# Patient Record
Sex: FEMALE | Race: WHITE | NOT HISPANIC OR LATINO | URBAN - METROPOLITAN AREA
[De-identification: names, ages, dates, MRNs, and addresses within clinical notes are randomized per-mention and may not be internally consistent; named-entity substitution may affect disease eponyms.]

---

## 2020-11-17 ENCOUNTER — OUT OF OFFICE VISIT (OUTPATIENT)
Dept: URBAN - METROPOLITAN AREA MEDICAL CENTER 28 | Facility: MEDICAL CENTER | Age: 85
End: 2020-11-17
Payer: MEDICARE

## 2020-11-17 DIAGNOSIS — K85.80 OTHER ACUTE PANCREATITIS WITHOUT INFECTION OR NECROSIS: ICD-10-CM

## 2020-11-17 DIAGNOSIS — D72.829 ELEVATED WBC COUNT: ICD-10-CM

## 2020-11-17 DIAGNOSIS — R74.01 ELEVATED ALANINE AMINOTRANSFERASE (ALT) LEVEL: ICD-10-CM

## 2020-11-17 PROCEDURE — 99232 SBSQ HOSP IP/OBS MODERATE 35: CPT | Performed by: INTERNAL MEDICINE

## 2020-11-17 PROCEDURE — G8427 DOCREV CUR MEDS BY ELIG CLIN: HCPCS | Performed by: INTERNAL MEDICINE

## 2020-11-17 PROCEDURE — 99222 1ST HOSP IP/OBS MODERATE 55: CPT | Performed by: INTERNAL MEDICINE

## 2020-11-19 ENCOUNTER — OUT OF OFFICE VISIT (OUTPATIENT)
Dept: URBAN - METROPOLITAN AREA MEDICAL CENTER 28 | Facility: MEDICAL CENTER | Age: 85
End: 2020-11-19
Payer: MEDICARE

## 2020-11-19 DIAGNOSIS — K80.50 BILE DUCT CALCULUS: ICD-10-CM

## 2020-11-19 DIAGNOSIS — R74.01 ELEVATED ALANINE AMINOTRANSFERASE (ALT) LEVEL: ICD-10-CM

## 2020-11-19 DIAGNOSIS — K86.89 ATROPHIC PANCREAS: ICD-10-CM

## 2020-11-19 DIAGNOSIS — K85.80 OTHER ACUTE PANCREATITIS WITHOUT INFECTION OR NECROSIS: ICD-10-CM

## 2020-11-19 PROCEDURE — 99232 SBSQ HOSP IP/OBS MODERATE 35: CPT | Performed by: INTERNAL MEDICINE

## 2020-11-21 ENCOUNTER — OUT OF OFFICE VISIT (OUTPATIENT)
Dept: URBAN - METROPOLITAN AREA MEDICAL CENTER 28 | Facility: MEDICAL CENTER | Age: 85
End: 2020-11-21
Payer: MEDICARE

## 2020-11-21 DIAGNOSIS — K85.80 OTHER ACUTE PANCREATITIS WITHOUT INFECTION OR NECROSIS: ICD-10-CM

## 2020-11-21 DIAGNOSIS — K80.50 BILE DUCT CALCULUS: ICD-10-CM

## 2020-11-21 DIAGNOSIS — K86.89 ATROPHIC PANCREAS: ICD-10-CM

## 2020-11-21 DIAGNOSIS — R74.01 ELEVATED ALANINE AMINOTRANSFERASE (ALT) LEVEL: ICD-10-CM

## 2020-11-21 PROCEDURE — 99232 SBSQ HOSP IP/OBS MODERATE 35: CPT | Performed by: INTERNAL MEDICINE

## 2020-11-22 ENCOUNTER — OUT OF OFFICE VISIT (OUTPATIENT)
Dept: URBAN - METROPOLITAN AREA MEDICAL CENTER 28 | Facility: MEDICAL CENTER | Age: 85
End: 2020-11-22
Payer: MEDICARE

## 2020-11-22 DIAGNOSIS — K80.50 BILE DUCT CALCULUS: ICD-10-CM

## 2020-11-22 PROCEDURE — 43235 EGD DIAGNOSTIC BRUSH WASH: CPT | Performed by: INTERNAL MEDICINE

## 2020-12-23 ENCOUNTER — WEB ENCOUNTER (OUTPATIENT)
Dept: URBAN - METROPOLITAN AREA CLINIC 96 | Facility: CLINIC | Age: 85
End: 2020-12-23

## 2020-12-23 ENCOUNTER — OFFICE VISIT (OUTPATIENT)
Dept: URBAN - METROPOLITAN AREA CLINIC 96 | Facility: CLINIC | Age: 85
End: 2020-12-23
Payer: MEDICARE

## 2020-12-23 DIAGNOSIS — K63.9 IRREGULAR BOWEL HABITS: ICD-10-CM

## 2020-12-23 DIAGNOSIS — K21.9 GERD (GASTROESOPHAGEAL REFLUX DISEASE): ICD-10-CM

## 2020-12-23 DIAGNOSIS — K57.30 COLON, DIVERTICULOSIS: ICD-10-CM

## 2020-12-23 DIAGNOSIS — Z12.11 SCREEN FOR COLON CANCER: ICD-10-CM

## 2020-12-23 PROCEDURE — G8938 BMI DOC ONL FUP NT DOC: HCPCS | Performed by: INTERNAL MEDICINE

## 2020-12-23 PROCEDURE — G8427 DOCREV CUR MEDS BY ELIG CLIN: HCPCS | Performed by: INTERNAL MEDICINE

## 2020-12-23 PROCEDURE — 1036F TOBACCO NON-USER: CPT | Performed by: INTERNAL MEDICINE

## 2020-12-23 PROCEDURE — G9903 PT SCRN TBCO ID AS NON USER: HCPCS | Performed by: INTERNAL MEDICINE

## 2020-12-23 PROCEDURE — 99203 OFFICE O/P NEW LOW 30 MIN: CPT | Performed by: INTERNAL MEDICINE

## 2020-12-23 NOTE — HPI-OTHER HISTORIES
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 her brother and son live w her at her house in [facility]. 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 4-5 loose bms/d.

## 2020-12-31 ENCOUNTER — TELEPHONE ENCOUNTER (OUTPATIENT)
Dept: URBAN - METROPOLITAN AREA CLINIC 92 | Facility: CLINIC | Age: 85
End: 2020-12-31

## 2020-12-31 LAB
FATS, NEUTRAL: NORMAL
FATS, TOTAL: (no result)
PANCREATIC ELASTASE, FECAL: 302

## 2020-12-31 RX ORDER — PANCRELIPASE 36000; 180000; 114000 [USP'U]/1; [USP'U]/1; [USP'U]/1
AS DIRECTED CAPSULE, DELAYED RELEASE PELLETS ORAL
Qty: 90 | Refills: 0 | OUTPATIENT
Start: 2020-12-31 | End: 2021-01-30

## 2021-01-05 ENCOUNTER — TELEPHONE ENCOUNTER (OUTPATIENT)
Dept: URBAN - METROPOLITAN AREA CLINIC 92 | Facility: CLINIC | Age: 86
End: 2021-01-05

## 2021-02-16 ENCOUNTER — OUT OF OFFICE VISIT (OUTPATIENT)
Dept: URBAN - METROPOLITAN AREA MEDICAL CENTER 28 | Facility: MEDICAL CENTER | Age: 86
End: 2021-02-16
Payer: MEDICARE

## 2021-02-16 DIAGNOSIS — K85.80 OTHER ACUTE PANCREATITIS WITHOUT INFECTION OR NECROSIS: ICD-10-CM

## 2021-02-16 PROCEDURE — 99222 1ST HOSP IP/OBS MODERATE 55: CPT | Performed by: INTERNAL MEDICINE

## 2021-02-16 PROCEDURE — G8427 DOCREV CUR MEDS BY ELIG CLIN: HCPCS | Performed by: INTERNAL MEDICINE

## 2021-02-17 ENCOUNTER — OUT OF OFFICE VISIT (OUTPATIENT)
Dept: URBAN - METROPOLITAN AREA MEDICAL CENTER 28 | Facility: MEDICAL CENTER | Age: 86
End: 2021-02-17
Payer: MEDICARE

## 2021-02-17 DIAGNOSIS — K86.89 ATROPHIC PANCREAS: ICD-10-CM

## 2021-02-17 DIAGNOSIS — K80.50 BILE DUCT CALCULUS: ICD-10-CM

## 2021-02-17 DIAGNOSIS — R50.9 ACUTE FEBRILE ILLNESS: ICD-10-CM

## 2021-02-17 DIAGNOSIS — K85.80 OTHER ACUTE PANCREATITIS WITHOUT INFECTION OR NECROSIS: ICD-10-CM

## 2021-02-17 PROCEDURE — 99232 SBSQ HOSP IP/OBS MODERATE 35: CPT | Performed by: PHYSICIAN ASSISTANT

## 2021-02-18 ENCOUNTER — OUT OF OFFICE VISIT (OUTPATIENT)
Dept: URBAN - METROPOLITAN AREA MEDICAL CENTER 28 | Facility: MEDICAL CENTER | Age: 86
End: 2021-02-18
Payer: MEDICARE

## 2021-02-18 DIAGNOSIS — K85.80 OTHER ACUTE PANCREATITIS WITHOUT INFECTION OR NECROSIS: ICD-10-CM

## 2021-02-18 DIAGNOSIS — K80.50 BILE DUCT CALCULUS: ICD-10-CM

## 2021-02-18 PROCEDURE — 99232 SBSQ HOSP IP/OBS MODERATE 35: CPT | Performed by: INTERNAL MEDICINE

## 2021-02-19 ENCOUNTER — OUT OF OFFICE VISIT (OUTPATIENT)
Dept: URBAN - METROPOLITAN AREA MEDICAL CENTER 28 | Facility: MEDICAL CENTER | Age: 86
End: 2021-02-19
Payer: MEDICARE

## 2021-02-19 DIAGNOSIS — K85.80 OTHER ACUTE PANCREATITIS WITHOUT INFECTION OR NECROSIS: ICD-10-CM

## 2021-02-19 DIAGNOSIS — K80.50 BILE DUCT CALCULUS: ICD-10-CM

## 2021-02-19 PROCEDURE — 99232 SBSQ HOSP IP/OBS MODERATE 35: CPT | Performed by: PHYSICIAN ASSISTANT

## 2021-03-24 ENCOUNTER — OFFICE VISIT (OUTPATIENT)
Dept: URBAN - METROPOLITAN AREA CLINIC 96 | Facility: CLINIC | Age: 86
End: 2021-03-24
Payer: MEDICARE

## 2021-03-24 ENCOUNTER — DASHBOARD ENCOUNTERS (OUTPATIENT)
Age: 86
End: 2021-03-24

## 2021-03-24 DIAGNOSIS — K80.50 CHOLEDOCHOLITHIASIS: ICD-10-CM

## 2021-03-24 DIAGNOSIS — K57.90 DIVERTICULOSIS: ICD-10-CM

## 2021-03-24 DIAGNOSIS — K21.9 GERD (GASTROESOPHAGEAL REFLUX DISEASE): ICD-10-CM

## 2021-03-24 DIAGNOSIS — K63.9 IRREGULAR BOWEL HABITS: ICD-10-CM

## 2021-03-24 PROBLEM — 8114009 DIVERTICULOSIS OF SMALL INTESTINE: Status: ACTIVE | Noted: 2020-12-23

## 2021-03-24 PROBLEM — 275978004 SCREENING FOR MALIGNANT NEOPLASM OF COLON: Status: ACTIVE | Noted: 2020-12-23

## 2021-03-24 PROBLEM — 398050005 DIVERTICULAR DISEASE OF COLON: Status: ACTIVE | Noted: 2020-12-23

## 2021-03-24 PROBLEM — 49436004 ATRIAL FIBRILLATION: Status: ACTIVE | Noted: 2020-12-23

## 2021-03-24 PROBLEM — 307132003 COMMON BILE DUCT CALCULUS: Status: ACTIVE | Noted: 2020-12-23

## 2021-03-24 PROBLEM — 235595009 GASTROESOPHAGEAL REFLUX DISEASE: Status: ACTIVE | Noted: 2020-12-23

## 2021-03-24 PROBLEM — 59037007: Status: ACTIVE | Noted: 2021-03-24

## 2021-03-24 PROBLEM — 275547005 HISTORY OF DUODENAL ULCER: Status: ACTIVE | Noted: 2020-12-23

## 2021-03-24 PROBLEM — 444702007 IRREGULAR BOWEL HABITS: Status: ACTIVE | Noted: 2020-12-23

## 2021-03-24 PROBLEM — 31258000 PANCREATIC CYST: Status: ACTIVE | Noted: 2020-12-23

## 2021-03-24 PROBLEM — 275538002 HISTORY OF ANEMIA: Status: ACTIVE | Noted: 2020-12-23

## 2021-03-24 PROBLEM — 85921000119107 HISTORY OF PANCREATITIS: Status: ACTIVE | Noted: 2020-12-23

## 2021-03-24 PROCEDURE — 99214 OFFICE O/P EST MOD 30 MIN: CPT | Performed by: INTERNAL MEDICINE

## 2021-03-24 NOTE — HPI-OTHER HISTORIES
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 her brother and son live w her at her house in [facility]. still very sharp. 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 4-5 loose bms/d. started on creon unfortunately, another episode of pancreatitis w admission  2/15-19/2021. PTC atempted by IR unsuccessful 2/2 to the ducts being too small. it was noted her CBD stones had dec from 4 to 2. seen by dr christianson. started on urosdiol w hopes of dec stones. took the meds x one mo. had to d/c 2/2 to side effects of nervousness, shakiness and oral ulcers. has finished the creon. ?? if it helped.

## 2021-04-05 ENCOUNTER — LAB OUTSIDE AN ENCOUNTER (OUTPATIENT)
Dept: URBAN - METROPOLITAN AREA CLINIC 96 | Facility: CLINIC | Age: 86
End: 2021-04-05

## 2021-04-06 LAB
A/G RATIO: 1.7
ALBUMIN: 4.3
ALKALINE PHOSPHATASE: 76
ALT (SGPT): 15
AST (SGOT): 14
BASO (ABSOLUTE): 0.1
BASOS: 1
BILIRUBIN, TOTAL: 0.4
BUN/CREATININE RATIO: 19
BUN: 17
CALCIUM: 10.3
CARBON DIOXIDE, TOTAL: 26
CHLORIDE: 102
CREATININE: 0.88
EGFR IF AFRICN AM: 68
EGFR IF NONAFRICN AM: 59
EOS (ABSOLUTE): 0.1
EOS: 2
FERRITIN, SERUM: 24
GLOBULIN, TOTAL: 2.5
GLUCOSE: 103
HEMATOCRIT: 42
HEMATOLOGY COMMENTS:: (no result)
HEMOGLOBIN: 13.6
IMMATURE CELLS: (no result)
IMMATURE GRANS (ABS): 0
IMMATURE GRANULOCYTES: 0
IRON BIND.CAP.(TIBC): 375
IRON SATURATION: 24
IRON: 90
LYMPHS (ABSOLUTE): 2.2
LYMPHS: 35
MAGNESIUM: 2.2
MCH: 30.2
MCHC: 32.4
MCV: 93
MONOCYTES(ABSOLUTE): 0.6
MONOCYTES: 9
NEUTROPHILS (ABSOLUTE): 3.2
NEUTROPHILS: 53
NRBC: (no result)
PLATELETS: 249
POTASSIUM: 4.3
PROTEIN, TOTAL: 6.8
RBC: 4.51
RDW: 13.6
SODIUM: 139
UIBC: 285
VITAMIN B12: 993
WBC: 6.1